# Patient Record
(demographics unavailable — no encounter records)

---

## 2025-05-01 NOTE — HISTORY OF PRESENT ILLNESS
[de-identified] : Is a very pleasant 70-year-old female referred by her physical therapist for a second opinion.  She has been diagnosed with sciatica and is looking for treatment options.  Is been going on now for several months.  Things have been progressively getting worse.  She is primarily symptomatic in the left lower extremity.  Is exacerbated by walking.  Denies any significant numbness tingling or weakness.  No recent changes in her bowel or bladder habits.  In terms of treatment, she has been undergoing physical therapy with modest improvement.  She has had several injections which sound like it was a combination of both facet and epidural type injection.  Those did not give her really any significant relief.  She at one point was placed on a very strong anti-inflammatory, unknown name, and gabapentin.  She took those for a week or 2 without significant improvement.  She is very active and would like to remain so.  Ideally she would like to try to avoid surgery if possible.

## 2025-05-01 NOTE — ASSESSMENT
[FreeTextEntry1] : Mrs. Alexandra has a lumbar spinal stenosis, spondylolisthesis, and lumbar radiculopathy.  We had a lengthy talk today in the office.  In terms of additional nonsurgical management, I would suggest that she go back onto the gabapentin, 300 mg in the evening.  I would typically suggest the addition of a daily NSAID however there is consideration for possibly having an additional epidural injection sometime in the near future.  In that case I would avoid the NSAIDs which may delay the injection and go ahead and on a regular regimen of Tylenol.  We talked about heat and ice.  We talked about incorporating acupuncture as an adjunct.  In terms of injections, I think she would likely benefit from an additional epidural injection.  She may need more than 1 injection.  She is going to follow-up with her pain management doctor.  In the unfortunate circumstance that nonsurgical measures fail, I think she would be a good candidate for surgery.  She would need a laminectomy and fusion at L4-5.  We talked about the surgery today in the office.  He is amenable to this plan.  All of her questions were addressed.  She will follow-up with me in the future if nonsurgical management fails.

## 2025-05-01 NOTE — PHYSICAL EXAM
[de-identified] : On exam she is a healthy female looks younger than her stated age.  She sits comfortably rises slowly ambulates with a nonantalgic gait.  No tenderness to palpation the lumbar spine.  No palpable muscle spasms.  In the seated position motor strength is 5/5.  Hip range of motion is painless.  Reflexes within normal limits.  Light touch sensations intact. [de-identified] : I independently reviewed an MRI scan of the lumbar spine.  It demonstrates multilevel spondylosis.  Germane to her complaint and physical exam is a grade 1/2 spondylolisthesis at L4-5.  There is severe central stenosis and significant bilateral foraminal stenosis at that level.